# Patient Record
Sex: MALE
[De-identification: names, ages, dates, MRNs, and addresses within clinical notes are randomized per-mention and may not be internally consistent; named-entity substitution may affect disease eponyms.]

---

## 2019-03-29 ENCOUNTER — APPOINTMENT (OUTPATIENT)
Dept: FAMILY MEDICINE | Facility: CLINIC | Age: 29
End: 2019-03-29
Payer: COMMERCIAL

## 2019-03-29 VITALS
HEIGHT: 67 IN | BODY MASS INDEX: 34.06 KG/M2 | HEART RATE: 82 BPM | SYSTOLIC BLOOD PRESSURE: 100 MMHG | RESPIRATION RATE: 16 BRPM | DIASTOLIC BLOOD PRESSURE: 68 MMHG | OXYGEN SATURATION: 98 % | WEIGHT: 217 LBS

## 2019-03-29 DIAGNOSIS — Z13.0 ENCOUNTER FOR SCREENING FOR OTHER SUSPECTED ENDOCRINE DISORDER: ICD-10-CM

## 2019-03-29 DIAGNOSIS — Z13.29 ENCOUNTER FOR SCREENING FOR OTHER SUSPECTED ENDOCRINE DISORDER: ICD-10-CM

## 2019-03-29 DIAGNOSIS — M54.5 LOW BACK PAIN: ICD-10-CM

## 2019-03-29 DIAGNOSIS — Z13.0 ENCOUNTER FOR SCREENING FOR DISEASES OF THE BLOOD AND BLOOD-FORMING ORGANS AND CERTAIN DISORDERS INVOLVING THE IMMUNE MECHANISM: ICD-10-CM

## 2019-03-29 DIAGNOSIS — Z23 ENCOUNTER FOR IMMUNIZATION: ICD-10-CM

## 2019-03-29 DIAGNOSIS — Z13.228 ENCOUNTER FOR SCREENING FOR OTHER SUSPECTED ENDOCRINE DISORDER: ICD-10-CM

## 2019-03-29 DIAGNOSIS — G89.29 LOW BACK PAIN: ICD-10-CM

## 2019-03-29 DIAGNOSIS — D17.9 BENIGN LIPOMATOUS NEOPLASM, UNSPECIFIED: ICD-10-CM

## 2019-03-29 DIAGNOSIS — M54.2 CERVICALGIA: ICD-10-CM

## 2019-03-29 DIAGNOSIS — Z13.220 ENCOUNTER FOR SCREENING FOR LIPOID DISORDERS: ICD-10-CM

## 2019-03-29 PROCEDURE — 90471 IMMUNIZATION ADMIN: CPT

## 2019-03-29 PROCEDURE — 99385 PREV VISIT NEW AGE 18-39: CPT | Mod: 25

## 2019-03-29 PROCEDURE — 90715 TDAP VACCINE 7 YRS/> IM: CPT

## 2019-03-29 RX ORDER — MULTIVITAMIN
TABLET ORAL
Refills: 0 | Status: ACTIVE | COMMUNITY

## 2019-03-29 NOTE — ASSESSMENT
[FreeTextEntry1] : Lipomas - referral to gen surg for removal\par chronic low back, neck pain - start PT. if no improvement will do further evaluation\par tdap administered, pt consent given before administration and after discussion of risks/benefits, pt tolerated well \par Healthy diet and regular exercise regimen discussed w/ pt.\par Screening labs ordered\par Any questions call office

## 2019-03-29 NOTE — HISTORY OF PRESENT ILLNESS
[de-identified] : New patient to establish care.\par Pt in office for CPE. Pt has been feeling well. Pt has hx of lipomas he would like removed by surgeon. Pt c/o chronic low back pain for years, nonradiating and chronic intermittent neck pain. Denies CP, palpitations, dyspnea, n/v.\par Nonsmoker\par ETOH use social\par Drug use marijuana every 2 days\par Exercises regularly 3-4x a week cardio/lifting\par Diet balanced increased protein chicken, eggs, steak\par Works as [employer] in [employer]\par Tdap today\par

## 2019-03-31 ENCOUNTER — TRANSCRIPTION ENCOUNTER (OUTPATIENT)
Age: 29
End: 2019-03-31

## 2019-04-03 LAB
ALBUMIN SERPL ELPH-MCNC: 5 G/DL
ALP BLD-CCNC: 66 U/L
ALT SERPL-CCNC: 40 U/L
ANION GAP SERPL CALC-SCNC: 12 MMOL/L
APPEARANCE: ABNORMAL
AST SERPL-CCNC: 24 U/L
BACTERIA: NEGATIVE
BASOPHILS # BLD AUTO: 0.03 K/UL
BASOPHILS NFR BLD AUTO: 0.4 %
BILIRUB SERPL-MCNC: 0.7 MG/DL
BILIRUBIN URINE: NEGATIVE
BLOOD URINE: NEGATIVE
BUN SERPL-MCNC: 13 MG/DL
CALCIUM SERPL-MCNC: 9.5 MG/DL
CHLORIDE SERPL-SCNC: 103 MMOL/L
CHOLEST SERPL-MCNC: 173 MG/DL
CHOLEST/HDLC SERPL: 4.6 RATIO
CO2 SERPL-SCNC: 26 MMOL/L
COLOR: YELLOW
CREAT SERPL-MCNC: 1.03 MG/DL
EOSINOPHIL # BLD AUTO: 0.06 K/UL
EOSINOPHIL NFR BLD AUTO: 0.9 %
ESTIMATED AVERAGE GLUCOSE: 100 MG/DL
GLUCOSE QUALITATIVE U: NEGATIVE
GLUCOSE SERPL-MCNC: 94 MG/DL
HBA1C MFR BLD HPLC: 5.1 %
HCT VFR BLD CALC: 48.2 %
HDLC SERPL-MCNC: 38 MG/DL
HGB BLD-MCNC: 16.2 G/DL
HYALINE CASTS: 1 /LPF
IMM GRANULOCYTES NFR BLD AUTO: 1.2 %
KETONES URINE: ABNORMAL
LDLC SERPL CALC-MCNC: 118 MG/DL
LEUKOCYTE ESTERASE URINE: NEGATIVE
LYMPHOCYTES # BLD AUTO: 1.91 K/UL
LYMPHOCYTES NFR BLD AUTO: 28.6 %
MAN DIFF?: NORMAL
MCHC RBC-ENTMCNC: 29.5 PG
MCHC RBC-ENTMCNC: 33.6 GM/DL
MCV RBC AUTO: 87.6 FL
MICROSCOPIC-UA: NORMAL
MONOCYTES # BLD AUTO: 0.51 K/UL
MONOCYTES NFR BLD AUTO: 7.6 %
NEUTROPHILS # BLD AUTO: 4.08 K/UL
NEUTROPHILS NFR BLD AUTO: 61.3 %
NITRITE URINE: NEGATIVE
PH URINE: 6
PLATELET # BLD AUTO: 245 K/UL
POTASSIUM SERPL-SCNC: 4.2 MMOL/L
PROT SERPL-MCNC: 7.7 G/DL
PROTEIN URINE: ABNORMAL
RBC # BLD: 5.5 M/UL
RBC # FLD: 12.6 %
RED BLOOD CELLS URINE: 1 /HPF
SODIUM SERPL-SCNC: 141 MMOL/L
SPECIFIC GRAVITY URINE: 1.03
SQUAMOUS EPITHELIAL CELLS: 0 /HPF
T4 FREE SERPL-MCNC: 1.2 NG/DL
TRIGL SERPL-MCNC: 84 MG/DL
TSH SERPL-ACNC: 1.25 UIU/ML
UROBILINOGEN URINE: NORMAL
WBC # FLD AUTO: 6.67 K/UL
WHITE BLOOD CELLS URINE: 1 /HPF

## 2019-04-07 ENCOUNTER — TRANSCRIPTION ENCOUNTER (OUTPATIENT)
Age: 29
End: 2019-04-07

## 2019-05-20 ENCOUNTER — APPOINTMENT (OUTPATIENT)
Dept: FAMILY MEDICINE | Facility: CLINIC | Age: 29
End: 2019-05-20
Payer: COMMERCIAL

## 2019-05-20 VITALS
OXYGEN SATURATION: 98 % | HEART RATE: 88 BPM | HEIGHT: 67 IN | TEMPERATURE: 98.5 F | BODY MASS INDEX: 34.06 KG/M2 | WEIGHT: 217 LBS | RESPIRATION RATE: 16 BRPM | DIASTOLIC BLOOD PRESSURE: 70 MMHG | SYSTOLIC BLOOD PRESSURE: 110 MMHG

## 2019-05-20 DIAGNOSIS — R20.2 PARESTHESIA OF SKIN: ICD-10-CM

## 2019-05-20 DIAGNOSIS — L29.9 PRURITUS, UNSPECIFIED: ICD-10-CM

## 2019-05-20 PROCEDURE — 99213 OFFICE O/P EST LOW 20 MIN: CPT

## 2019-05-20 RX ORDER — METHYLPREDNISOLONE 4 MG/1
4 TABLET ORAL
Qty: 1 | Refills: 0 | Status: ACTIVE | COMMUNITY
Start: 2019-05-20 | End: 1900-01-01

## 2019-05-20 RX ORDER — HYDROXYZINE HYDROCHLORIDE 25 MG/1
25 TABLET ORAL 3 TIMES DAILY
Qty: 30 | Refills: 0 | Status: ACTIVE | COMMUNITY
Start: 2019-05-20 | End: 1900-01-01

## 2019-05-20 NOTE — PHYSICAL EXAM
[de-identified] : macular erythematous rash on R neck, pruritic, no visible rash on b/l arms or legs [de-identified] : stinging paresthesias on b/l hands, feet

## 2019-05-20 NOTE — ASSESSMENT
[FreeTextEntry1] : pruritis, paresthesias of hands - check cbc, b12/folate, histamine. start hydroxyzine, medrol jaja. Possible adverse effects discussed with pt. If no improvement advised f/u with allergist

## 2019-05-20 NOTE — HISTORY OF PRESENT ILLNESS
[FreeTextEntry8] : Pt c/o "pinpricks" in hands and feet for past month. Pt then experiences diffuse itching at the end of the day. Denies rash when feeling pruritic. Denies new soaps, detergents, medications, supplements. Pt has tried benadryl and cortisone cream but has not stopped the itch. Cold showers improve itching.

## 2019-05-24 LAB
BASOPHILS # BLD AUTO: 0.03 K/UL
BASOPHILS NFR BLD AUTO: 0.5 %
EOSINOPHIL # BLD AUTO: 0.07 K/UL
EOSINOPHIL NFR BLD AUTO: 1.1 %
FOLATE SERPL-MCNC: 13.7 NG/ML
HCT VFR BLD CALC: 45.1 %
HGB BLD-MCNC: 15.5 G/DL
HISTAMINE BLD-MCNC: 0.29 NG/ML
IMM GRANULOCYTES NFR BLD AUTO: 1.2 %
LYMPHOCYTES # BLD AUTO: 1.75 K/UL
LYMPHOCYTES NFR BLD AUTO: 27.1 %
MAN DIFF?: NORMAL
MCHC RBC-ENTMCNC: 29.5 PG
MCHC RBC-ENTMCNC: 34.4 GM/DL
MCV RBC AUTO: 85.9 FL
MONOCYTES # BLD AUTO: 0.44 K/UL
MONOCYTES NFR BLD AUTO: 6.8 %
NEUTROPHILS # BLD AUTO: 4.08 K/UL
NEUTROPHILS NFR BLD AUTO: 63.3 %
PLATELET # BLD AUTO: 201 K/UL
RBC # BLD: 5.25 M/UL
RBC # FLD: 12.3 %
VIT B12 SERPL-MCNC: 435 PG/ML
WBC # FLD AUTO: 6.45 K/UL

## 2022-05-17 ENCOUNTER — APPOINTMENT (OUTPATIENT)
Dept: FAMILY MEDICINE | Facility: CLINIC | Age: 32
End: 2022-05-17
Payer: COMMERCIAL

## 2022-05-17 VITALS
WEIGHT: 225 LBS | HEART RATE: 77 BPM | DIASTOLIC BLOOD PRESSURE: 82 MMHG | TEMPERATURE: 97.2 F | BODY MASS INDEX: 35.31 KG/M2 | SYSTOLIC BLOOD PRESSURE: 124 MMHG | OXYGEN SATURATION: 98 % | HEIGHT: 67 IN

## 2022-05-17 DIAGNOSIS — H91.90 UNSPECIFIED HEARING LOSS, UNSPECIFIED EAR: ICD-10-CM

## 2022-05-17 DIAGNOSIS — Z00.00 ENCOUNTER FOR GENERAL ADULT MEDICAL EXAMINATION W/OUT ABNORMAL FINDINGS: ICD-10-CM

## 2022-05-17 PROCEDURE — 86580 TB INTRADERMAL TEST: CPT

## 2022-05-17 PROCEDURE — 99385 PREV VISIT NEW AGE 18-39: CPT | Mod: 25

## 2022-05-17 NOTE — HISTORY OF PRESENT ILLNESS
[FreeTextEntry1] : annual [de-identified] : annual well active attempting to exercise and eat healthy non smoker no medical history and on no daily medications denies chest pain palpitations or dyspnea needs clearance for new job self testciular exam states hearing loss and tinnitus

## 2022-05-18 LAB
25(OH)D3 SERPL-MCNC: 16.8 NG/ML
ALBUMIN SERPL ELPH-MCNC: 4.7 G/DL
ALP BLD-CCNC: 66 U/L
ALT SERPL-CCNC: 44 U/L
ANION GAP SERPL CALC-SCNC: 12 MMOL/L
AST SERPL-CCNC: 24 U/L
BASOPHILS # BLD AUTO: 0.01 K/UL
BASOPHILS NFR BLD AUTO: 0.2 %
BILIRUB SERPL-MCNC: 0.5 MG/DL
BUN SERPL-MCNC: 9 MG/DL
C TRACH RRNA SPEC QL NAA+PROBE: NOT DETECTED
CALCIUM SERPL-MCNC: 9.1 MG/DL
CHLORIDE SERPL-SCNC: 105 MMOL/L
CHOLEST SERPL-MCNC: 183 MG/DL
CO2 SERPL-SCNC: 24 MMOL/L
CREAT SERPL-MCNC: 0.99 MG/DL
EGFR: 104 ML/MIN/1.73M2
EOSINOPHIL # BLD AUTO: 0.11 K/UL
EOSINOPHIL NFR BLD AUTO: 1.9 %
ESTIMATED AVERAGE GLUCOSE: 103 MG/DL
GLUCOSE SERPL-MCNC: 94 MG/DL
HBA1C MFR BLD HPLC: 5.2 %
HCT VFR BLD CALC: 46.8 %
HDLC SERPL-MCNC: 37 MG/DL
HGB BLD-MCNC: 15.8 G/DL
HIV1+2 AB SPEC QL IA.RAPID: NONREACTIVE
HSV 1+2 IGG SER IA-IMP: NEGATIVE
HSV 1+2 IGG SER IA-IMP: POSITIVE
HSV1 IGG SER QL: 37.8 INDEX
HSV2 IGG SER QL: 0.25 INDEX
IMM GRANULOCYTES NFR BLD AUTO: 1.2 %
LDLC SERPL CALC-MCNC: 130 MG/DL
LYMPHOCYTES # BLD AUTO: 1.88 K/UL
LYMPHOCYTES NFR BLD AUTO: 31.6 %
MAN DIFF?: NORMAL
MCHC RBC-ENTMCNC: 29.9 PG
MCHC RBC-ENTMCNC: 33.8 GM/DL
MCV RBC AUTO: 88.6 FL
MONOCYTES # BLD AUTO: 0.48 K/UL
MONOCYTES NFR BLD AUTO: 8.1 %
N GONORRHOEA RRNA SPEC QL NAA+PROBE: NOT DETECTED
NEUTROPHILS # BLD AUTO: 3.39 K/UL
NEUTROPHILS NFR BLD AUTO: 57 %
NONHDLC SERPL-MCNC: 146 MG/DL
PLATELET # BLD AUTO: 220 K/UL
POTASSIUM SERPL-SCNC: 4.7 MMOL/L
PROT SERPL-MCNC: 7.1 G/DL
RBC # BLD: 5.28 M/UL
RBC # FLD: 12.4 %
SODIUM SERPL-SCNC: 140 MMOL/L
SOURCE AMPLIFICATION: NORMAL
T PALLIDUM AB SER QL IA: NEGATIVE
T4 SERPL-MCNC: 6.6 UG/DL
TRIGL SERPL-MCNC: 82 MG/DL
TSH SERPL-ACNC: 0.76 UIU/ML
URATE SERPL-MCNC: 5.3 MG/DL
WBC # FLD AUTO: 5.94 K/UL

## 2022-05-20 ENCOUNTER — APPOINTMENT (OUTPATIENT)
Dept: FAMILY MEDICINE | Facility: CLINIC | Age: 32
End: 2022-05-20

## 2022-05-20 VITALS — TEMPERATURE: 97.2 F

## 2022-05-31 LAB
HSV1 IGM SER QL: NEGATIVE
HSV2 AB FLD-ACNC: NEGATIVE